# Patient Record
Sex: FEMALE | Race: WHITE | URBAN - METROPOLITAN AREA
[De-identification: names, ages, dates, MRNs, and addresses within clinical notes are randomized per-mention and may not be internally consistent; named-entity substitution may affect disease eponyms.]

---

## 2022-10-04 ENCOUNTER — LAB OUTSIDE AN ENCOUNTER (OUTPATIENT)
Dept: URBAN - METROPOLITAN AREA CLINIC 98 | Facility: CLINIC | Age: 67
End: 2022-10-04

## 2022-10-04 ENCOUNTER — OFFICE VISIT (OUTPATIENT)
Dept: URBAN - METROPOLITAN AREA CLINIC 98 | Facility: CLINIC | Age: 67
End: 2022-10-04
Payer: MEDICARE

## 2022-10-04 ENCOUNTER — WEB ENCOUNTER (OUTPATIENT)
Dept: URBAN - METROPOLITAN AREA CLINIC 98 | Facility: CLINIC | Age: 67
End: 2022-10-04

## 2022-10-04 VITALS
DIASTOLIC BLOOD PRESSURE: 73 MMHG | HEIGHT: 59 IN | SYSTOLIC BLOOD PRESSURE: 108 MMHG | WEIGHT: 127.8 LBS | HEART RATE: 75 BPM | TEMPERATURE: 97.2 F | BODY MASS INDEX: 25.76 KG/M2

## 2022-10-04 DIAGNOSIS — R19.4 CHANGE IN BOWEL HABITS: ICD-10-CM

## 2022-10-04 PROCEDURE — 99214 OFFICE O/P EST MOD 30 MIN: CPT | Performed by: INTERNAL MEDICINE

## 2022-10-04 RX ORDER — CLONAZEPAM 0.5 MG/1
1 TABLET AT BEDTIME TABLET ORAL ONCE A DAY
Status: ACTIVE | COMMUNITY

## 2022-10-04 RX ORDER — METHOCARBAMOL 750 MG/1
1 TABLET TABLET ORAL
Status: ACTIVE | COMMUNITY

## 2022-10-04 RX ORDER — LEVOTHYROXINE SODIUM 88 UG/1
1 TABLET IN THE MORNING ON AN EMPTY STOMACH TABLET ORAL TWICE A DAY
Status: ACTIVE | COMMUNITY

## 2022-10-04 RX ORDER — ZOLPIDEM TARTRATE 10 MG/1
1 TABLET AT BEDTIME AS NEEDED TABLET, FILM COATED ORAL ONCE A DAY
Status: ACTIVE | COMMUNITY

## 2022-10-04 NOTE — HPI-TODAY'S VISIT:
Here with 10 d bloating watery diarrhea weakness nausea headache  bm 4 / day  started pepto - still soft stool but black  probiotics started tolerating diet including fish tacos

## 2022-10-05 ENCOUNTER — LAB OUTSIDE AN ENCOUNTER (OUTPATIENT)
Dept: URBAN - METROPOLITAN AREA CLINIC 98 | Facility: CLINIC | Age: 67
End: 2022-10-05

## 2022-10-12 LAB — GASTROINTESTINAL PATHOGEN: (no result)

## 2022-10-13 ENCOUNTER — TELEPHONE ENCOUNTER (OUTPATIENT)
Dept: URBAN - METROPOLITAN AREA CLINIC 98 | Facility: CLINIC | Age: 67
End: 2022-10-13

## 2023-06-16 ENCOUNTER — OFFICE VISIT (OUTPATIENT)
Dept: URBAN - METROPOLITAN AREA CLINIC 98 | Facility: CLINIC | Age: 68
End: 2023-06-16

## 2024-03-13 ENCOUNTER — OV EP (OUTPATIENT)
Dept: URBAN - METROPOLITAN AREA CLINIC 98 | Facility: CLINIC | Age: 69
End: 2024-03-13
Payer: MEDICARE

## 2024-03-13 VITALS
HEART RATE: 74 BPM | WEIGHT: 128.8 LBS | DIASTOLIC BLOOD PRESSURE: 80 MMHG | HEIGHT: 59 IN | TEMPERATURE: 97.8 F | BODY MASS INDEX: 25.96 KG/M2 | SYSTOLIC BLOOD PRESSURE: 117 MMHG

## 2024-03-13 DIAGNOSIS — R19.5 LOOSE STOOLS: ICD-10-CM

## 2024-03-13 DIAGNOSIS — R15.9 INCONTINENCE OF FECES, UNSPECIFIED FECAL INCONTINENCE TYPE: ICD-10-CM

## 2024-03-13 PROBLEM — 72042002: Status: ACTIVE | Noted: 2024-03-13

## 2024-03-13 PROCEDURE — 99213 OFFICE O/P EST LOW 20 MIN: CPT

## 2024-03-13 RX ORDER — METHOCARBAMOL 750 MG/1
1 TABLET TABLET ORAL
Status: ON HOLD | COMMUNITY

## 2024-03-13 RX ORDER — ZOLPIDEM TARTRATE 10 MG/1
1 TABLET AT BEDTIME AS NEEDED TABLET, FILM COATED ORAL ONCE A DAY
Status: ACTIVE | COMMUNITY

## 2024-03-13 RX ORDER — CLONAZEPAM 0.5 MG/1
1 TABLET AT BEDTIME TABLET ORAL ONCE A DAY
Status: ON HOLD | COMMUNITY

## 2024-03-13 RX ORDER — LEVOTHYROXINE SODIUM 88 UG/1
1 TABLET IN THE MORNING ON AN EMPTY STOMACH TABLET ORAL TWICE A DAY
Status: ACTIVE | COMMUNITY

## 2024-03-13 NOTE — HPI-TODAY'S VISIT:
Visit 10/4/2022- Dr. Torres Here with 10 d bloating watery diarrhea weakness nausea headache  bm 4 / day  started pepto - still soft stool but black  probiotics started tolerating diet including fish tacos Visit 3/13/2024- Annemarie Montano NP - Here with complaints of fecal incontinence  - Dx. with mild Parkinson's 11/2023 - Fecal incontinence 3 episodes in 1 year  - Last episode last Thursday - Patient in the car; sudden abdominal cramping with immediate fecal release - Reports she 1 episode with full evacuation. - Typically BM 2 times per day - Stools are soft for past 6 months - Denies bright red blood, melena or mucus in stools - No unintentional weight loss - Denies nausea, vomiting, heartburn - Dysphagia at times with medications - Started magnesium 2 months ago for because she does feel evacuated - Last colonoscopy 3/11/2020 > once benign colon polyp, internal hemorrhoids.

## 2024-03-25 ENCOUNTER — OV EP (OUTPATIENT)
Dept: URBAN - METROPOLITAN AREA CLINIC 98 | Facility: CLINIC | Age: 69
End: 2024-03-25
Payer: MEDICARE

## 2024-03-25 ENCOUNTER — LAB (OUTPATIENT)
Dept: URBAN - METROPOLITAN AREA CLINIC 98 | Facility: CLINIC | Age: 69
End: 2024-03-25

## 2024-03-25 VITALS
BODY MASS INDEX: 24.19 KG/M2 | SYSTOLIC BLOOD PRESSURE: 127 MMHG | TEMPERATURE: 97.2 F | WEIGHT: 120 LBS | HEIGHT: 59 IN | DIASTOLIC BLOOD PRESSURE: 83 MMHG | HEART RATE: 80 BPM | RESPIRATION RATE: 18 BRPM

## 2024-03-25 DIAGNOSIS — K86.81 EXOCRINE PANCREATIC INSUFFICIENCY: ICD-10-CM

## 2024-03-25 DIAGNOSIS — R63.4 WEIGHT LOSS: ICD-10-CM

## 2024-03-25 DIAGNOSIS — R19.5 LOOSE STOOLS: ICD-10-CM

## 2024-03-25 PROCEDURE — 99214 OFFICE O/P EST MOD 30 MIN: CPT

## 2024-03-25 RX ORDER — LEVOTHYROXINE SODIUM 88 UG/1
1 TABLET IN THE MORNING ON AN EMPTY STOMACH TABLET ORAL TWICE A DAY
Status: ACTIVE | COMMUNITY

## 2024-03-25 RX ORDER — ZOLPIDEM TARTRATE 10 MG/1
1 TABLET AT BEDTIME AS NEEDED TABLET, FILM COATED ORAL ONCE A DAY
Status: ACTIVE | COMMUNITY

## 2024-03-25 RX ORDER — METHOCARBAMOL 750 MG/1
1 TABLET TABLET ORAL
COMMUNITY

## 2024-03-25 RX ORDER — PANCRELIPASE 36000; 180000; 114000 [USP'U]/1; [USP'U]/1; [USP'U]/1
2 CAPSULES WITH THREE LARGE MEALS AND 1-2 CAPSULES WITH TWO SNACKS CAPSULE, DELAYED RELEASE PELLETS ORAL AS DIRECTED
Qty: 300 | Refills: 11 | OUTPATIENT
Start: 2024-03-25 | End: 2024-04-06

## 2024-03-25 RX ORDER — CLONAZEPAM 0.5 MG/1
1 TABLET AT BEDTIME TABLET ORAL ONCE A DAY
COMMUNITY

## 2024-03-25 NOTE — HPI-TODAY'S VISIT:
Visit 10/4/2022- Dr. Torres Here with 10 d bloating watery diarrhea weakness nausea headache  bm 4 / day  started pepto - still soft stool but black  probiotics started tolerating diet including fish tacos Visit 3/13/2024- Annemarie Montano NP - Here with complaints of fecal incontinence  - Dx. with mild Parkinson's 11/2023 - Fecal incontinence 3 episodes in 1 year  - Last episode last Thursday - Patient in the car; sudden abdominal cramping with immediate fecal release - Reports she 1 episode with full evacuation. - Typically BM 2 times per day - Stools are soft for past 6 months - Denies bright red blood, melena or mucus in stools - No unintentional weight loss - Denies nausea, vomiting, heartburn - Dysphagia at times with medications - Started magnesium 2 months ago for because she does feel evacuated - Last colonoscopy 3/11/2020 > once benign colon polyp, internal hemorrhoids. Visit 3/25/24- Annemarie Montano NP - Here to follow-up and discuss stool studies - Pancreatic elastase > 192 - No episodes of incontinence since last visit - BM 2 times per day - Mother primary pancreatic cancer at 74

## 2024-03-26 ENCOUNTER — LAB (OUTPATIENT)
Dept: URBAN - METROPOLITAN AREA CLINIC 98 | Facility: CLINIC | Age: 69
End: 2024-03-26

## 2024-03-26 PROBLEM — 123608004: Status: ACTIVE | Noted: 2024-03-26

## 2024-05-01 ENCOUNTER — OFFICE VISIT (OUTPATIENT)
Dept: URBAN - METROPOLITAN AREA MEDICAL CENTER 28 | Facility: MEDICAL CENTER | Age: 69
End: 2024-05-01
Payer: MEDICARE

## 2024-05-01 DIAGNOSIS — K31.89 OTHER DISEASES OF STOMACH AND DUODENUM: ICD-10-CM

## 2024-05-01 DIAGNOSIS — R93.3 ABN FINDINGS-GI TRACT: ICD-10-CM

## 2024-05-01 DIAGNOSIS — K86.89 OTHER SPECIFIED DISEASES OF PANCREAS: ICD-10-CM

## 2024-05-01 PROCEDURE — 43259 EGD US EXAM DUODENUM/JEJUNUM: CPT | Performed by: INTERNAL MEDICINE

## 2024-05-01 PROCEDURE — 43239 EGD BIOPSY SINGLE/MULTIPLE: CPT | Performed by: INTERNAL MEDICINE

## 2024-05-01 RX ORDER — LEVOTHYROXINE SODIUM 88 UG/1
1 TABLET IN THE MORNING ON AN EMPTY STOMACH TABLET ORAL TWICE A DAY
Status: ACTIVE | COMMUNITY

## 2024-05-01 RX ORDER — METHOCARBAMOL 750 MG/1
1 TABLET TABLET ORAL
COMMUNITY

## 2024-05-01 RX ORDER — CLONAZEPAM 0.5 MG/1
1 TABLET AT BEDTIME TABLET ORAL ONCE A DAY
COMMUNITY

## 2024-05-01 RX ORDER — ZOLPIDEM TARTRATE 10 MG/1
1 TABLET AT BEDTIME AS NEEDED TABLET, FILM COATED ORAL ONCE A DAY
Status: ACTIVE | COMMUNITY

## 2024-05-03 ENCOUNTER — TELEPHONE ENCOUNTER (OUTPATIENT)
Dept: URBAN - METROPOLITAN AREA CLINIC 98 | Facility: CLINIC | Age: 69
End: 2024-05-03

## 2024-05-21 ENCOUNTER — OFFICE VISIT (OUTPATIENT)
Dept: URBAN - METROPOLITAN AREA CLINIC 98 | Facility: CLINIC | Age: 69
End: 2024-05-21

## 2024-06-20 ENCOUNTER — OFFICE VISIT (OUTPATIENT)
Dept: URBAN - METROPOLITAN AREA CLINIC 98 | Facility: CLINIC | Age: 69
End: 2024-06-20

## 2024-07-16 ENCOUNTER — DASHBOARD ENCOUNTERS (OUTPATIENT)
Age: 69
End: 2024-07-16

## 2024-07-17 ENCOUNTER — OFFICE VISIT (OUTPATIENT)
Dept: URBAN - METROPOLITAN AREA CLINIC 98 | Facility: CLINIC | Age: 69
End: 2024-07-17

## 2024-07-17 VITALS
SYSTOLIC BLOOD PRESSURE: 102 MMHG | DIASTOLIC BLOOD PRESSURE: 67 MMHG | TEMPERATURE: 97.3 F | WEIGHT: 125.4 LBS | BODY MASS INDEX: 25.28 KG/M2 | HEART RATE: 69 BPM | HEIGHT: 59 IN

## 2024-07-17 RX ORDER — ZOLPIDEM TARTRATE 10 MG/1
1 TABLET AT BEDTIME AS NEEDED TABLET, FILM COATED ORAL ONCE A DAY
Status: ON HOLD | COMMUNITY

## 2024-07-17 RX ORDER — CLONAZEPAM 0.5 MG/1
1 TABLET AT BEDTIME TABLET ORAL ONCE A DAY
COMMUNITY

## 2024-07-17 RX ORDER — METHOCARBAMOL 750 MG/1
1 TABLET TABLET ORAL
COMMUNITY

## 2024-07-17 RX ORDER — LEVOTHYROXINE SODIUM 88 UG/1
1 TABLET IN THE MORNING ON AN EMPTY STOMACH TABLET ORAL TWICE A DAY
Status: ACTIVE | COMMUNITY

## 2024-07-17 NOTE — HPI-TODAY'S VISIT:
Visit 10/4/2022- Dr. Torres Here with 10 d bloating watery diarrhea weakness nausea headache  bm 4 / day  started pepto - still soft stool but black  probiotics started tolerating diet including fish tacos Visit 3/13/2024- Annemarie Montano NP - Here with complaints of fecal incontinence  - Dx. with mild Parkinson's 11/2023 - Fecal incontinence 3 episodes in 1 year  - Last episode last Thursday - Patient in the car; sudden abdominal cramping with immediate fecal release - Reports she 1 episode with full evacuation. - Typically BM 2 times per day - Stools are soft for past 6 months - Denies bright red blood, melena or mucus in stools - No unintentional weight loss - Denies nausea, vomiting, heartburn - Dysphagia at times with medications - Started magnesium 2 months ago for because she does feel evacuated - Last colonoscopy 3/11/2020 > once benign colon polyp, internal hemorrhoids. Visit 3/25/24- Annemarie Montano NP - Here to follow-up and discuss stool studies - Pancreatic elastase > 192 - No episodes of incontinence since last visit - BM 2 times per day - Mother primary pancreatic cancer at 74  7/17/24- Annemarie Montano NP - Here to follow-up EPI, dilated CBD and EUS - EUS 5/1/24 - Taking CREON 1 capsule with large meals   3/26/2024 09:06 AMPatient ID: [id number]            Exam Name: MR Abdomen (MRCP) w and wo IVAge:        69Y 1M        Contrast   [id number]:        1955         Referrer:  Eli Montano, [facility] #:      [id number]  EXAM: MR ABDOMEN WITH AND WITHOUT CONTRAST W MRCP  CLINICAL INDICATION:  - Exocrine pancreatic insufficiency. TECHNIQUE: Multiplanar multisequence images were obtained through the abdomen without and with IVadministration of 12 mL of Clariscan. MRCP images were also obtained including coronal 3-D MRCP images.  COMPARISON: No comparisons are available at this time.  FINDINGS:  There is mild limitation due to motion. Evaluation of the visualized lower chest demonstrates the heart is within normal limits in size. There isno significant sized pleural or pericardial effusion at the visualized lung bases. Please note the lungsare suboptimally evaluated with MRI. There is mild elevation of the left hemidiaphragm.  The liver is within normal limits in size and the right hepatic lobe measures 12.6 cm in length. There isno significant hepatic steatosis. There is no suspicious enhancing hepatic mass.  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 The patient is statuspost splenectomy. The pancreatic head and and neck are preserved. The main pancreatic duct is visualizedin the pancreatic head and neck and is nondilated and measuring 3 mm. There is pronounced inferioratrophy of the pancreatic body and tail. Alternatively linear signal intensity at the expected locationof the pancreatic body and tail may represent postsurgical change status post partial pancreatectomy.Correlation with the surgical history is recommended. The kidneys enhance symmetrically. There is no hydronephrosis.  The abdominal aorta is patent without aneurysmal dilatation. The celiac is patent. The SMA and inferiormesenteric arteries are patent. The hepatic veins and portal veins are patent.  There is no abdominal ascites. There is no abdominal lymphadenopathy by size criteria. There is no evidence for mechanical intestinal obstruction of the visualized bowel. The bowel is notimaged in its entirety. There are multiple foci of susceptibility artifact along the anterior abdominal wall consistent withpostsurgical change. There is an S-shaped scoliosis of the thoracolumbar spine. There is partiallyvisualizes susceptibility artifact in the right hip status post right hip arthroplasty. No suspiciousosseous lesion is identified.  MRCP: The gallbladder is unremarkable within the limitation of motion. There is mild diffuse intrahepaticand extrahepatic biliary dilatation. The common bile duct measures 7 mm. No biliary stricture or fillingdefect is identified within the limitation of motion.  As described the main pancreatic duct is within normal limits in course and caliber within the pancreatichead and neck. There is no evidence for pancreas divisum.  IMPRESSION:1. Status post splenectomy. The pancreatic body and tail are severely atrophic or less likely, absentwith postsurgical change and linear stranding. The pancreatic head and neck appear preserved. Correlationwith the prior surgical history is recommended.2. No suspicious pancreatic mass is identified. No MRI evidence for acute pancreatitis.3. Mild diffuse intrahepatic and extrahepatic biliary dilatation to the ampulla. No biliary stricture orfilling defect is identified. This may be at the upper limits of normal for the patient's age. Otheretiologies such as sphincter of Dae dysfunction are in the differential. Further evaluation with biliarylabs values is recommended. Consider endoscopic correlation if indicated/if there is evidence for biliaryobstruction.

## 2025-01-09 ENCOUNTER — WEB ENCOUNTER (OUTPATIENT)
Dept: URBAN - METROPOLITAN AREA CLINIC 96 | Facility: CLINIC | Age: 70
End: 2025-01-09

## 2025-01-15 ENCOUNTER — OFFICE VISIT (OUTPATIENT)
Dept: URBAN - METROPOLITAN AREA CLINIC 98 | Facility: CLINIC | Age: 70
End: 2025-01-15

## 2025-03-20 ENCOUNTER — TELEPHONE ENCOUNTER (OUTPATIENT)
Dept: URBAN - METROPOLITAN AREA CLINIC 98 | Facility: CLINIC | Age: 70
End: 2025-03-20

## 2025-03-25 ENCOUNTER — OFFICE VISIT (OUTPATIENT)
Dept: URBAN - METROPOLITAN AREA CLINIC 98 | Facility: CLINIC | Age: 70
End: 2025-03-25

## 2025-03-25 RX ORDER — METHOCARBAMOL 750 MG/1
1 TABLET TABLET ORAL
Status: ACTIVE | COMMUNITY

## 2025-03-25 RX ORDER — PANCRELIPASE 36000; 180000; 114000 [USP'U]/1; [USP'U]/1; [USP'U]/1
2 TABLETS WITH MEALS, AND 1-2 TABLET WITH SNACKS CAPSULE, DELAYED RELEASE PELLETS ORAL
Qty: 300 | Refills: 11 | OUTPATIENT
Start: 2025-03-25

## 2025-03-25 RX ORDER — CLONAZEPAM 0.5 MG/1
1 TABLET AT BEDTIME TABLET ORAL ONCE A DAY
Status: ACTIVE | COMMUNITY

## 2025-03-25 RX ORDER — LEVOTHYROXINE SODIUM 75 UG/1
1 TABLET IN THE MORNING ON AN EMPTY STOMACH TABLET ORAL TWICE A DAY
Status: ACTIVE | COMMUNITY

## 2025-03-25 RX ORDER — ZOLPIDEM TARTRATE 10 MG/1
1 TABLET AT BEDTIME AS NEEDED TABLET, FILM COATED ORAL ONCE A DAY
Status: ACTIVE | COMMUNITY

## 2025-03-25 NOTE — HPI-TODAY'S VISIT:
Visit 10/4/2022- Dr. Torres Here with 10 d bloating watery diarrhea weakness nausea headache  bm 4 / day  started pepto - still soft stool but black  probiotics started tolerating diet including fish tacos Visit 3/13/2024- Annemarie Montano NP - Here with complaints of fecal incontinence  - Dx. with mild Parkinson's 11/2023 - Fecal incontinence 3 episodes in 1 year  - Last episode last Thursday - Patient in the car; sudden abdominal cramping with immediate fecal release - Reports she 1 episode with full evacuation. - Typically BM 2 times per day - Stools are soft for past 6 months - Denies bright red blood, melena or mucus in stools - No unintentional weight loss - Denies nausea, vomiting, heartburn - Dysphagia at times with medications - Started magnesium 2 months ago for because she does feel evacuated - Last colonoscopy 3/11/2020 > once benign colon polyp, internal hemorrhoids. Visit 3/25/24- Anneamrie Montano NP - Here to follow-up and discuss stool studies - Pancreatic elastase > 192 - No episodes of incontinence since last visit - BM 2 times per day - Mother primary pancreatic cancer at 74  7/17/24- Annemarie Montano NP - Here to follow-up EPI, dilated CBD and EUS - Friend here for office visit today - EUS 5/1/24: normal esophagus, normal stomach, normal duodenum. Pancreatic cyst in body 6 mm no bx of tissue and no FNA. No abnormality in ampulla, CBD or pancreatic duct. Entire pancreas with atrophy and diffuseechogenicity - Taking CREON 1 capsule with large meals  3/26/2024 MR ABDOMEN WITH AND WITHOUT CONTRAST W MRCP CLINICAL INDICATION:  - Exocrine pancreatic insufficiency. TECHNIQUE: Multiplanar multisequence images were obtained through the abdomen without and with IVadministration of 12 mL of Clariscan. MRCP images were also obtained including coronal 3-D MRCP images. COMPARISON: No comparisons are available at this time. FINDINGS: There is mild limitation due to motion. Evaluation of the visualized lower chest demonstrates the heart is within normal limits in size. There isno significant sized pleural or pericardial effusion at the visualized lung bases. Please note the lungsare suboptimally evaluated with MRI. There is mild elevation of the left hemidiaphragm. The liver is within normal limits in size and the right hepatic lobe measures 12.6 cm in length. There isno significant hepatic steatosis. There is no suspicious enhancing hepatic mass. 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 The patient is statuspost splenectomy. The pancreatic head and and neck are preserved. The main pancreatic duct is visualizedin the pancreatic head and neck and is nondilated and measuring 3 mm. There is pronounced inferioratrophy of the pancreatic body and tail. Alternatively linear signal intensity at the expected locationof the pancreatic body and tail may represent postsurgical change status post partial pancreatectomy.Correlation with the surgical history is recommended. The kidneys enhance symmetrically. There is no hydronephrosis. The abdominal aorta is patent without aneurysmal dilatation. The celiac is patent. The SMA and inferiormesenteric arteries are patent. The hepatic veins and portal veins are patent. There is no abdominal ascites. There is no abdominal lymphadenopathy by size criteria. There is no evidence for mechanical intestinal obstruction of the visualized bowel. The bowel is notimaged in its entirety. There are multiple foci of susceptibility artifact along the anterior abdominal wall consistent withpostsurgical change. There is an S-shaped scoliosis of the thoracolumbar spine. There is partiallyvisualizes susceptibility artifact in the right hip status post right hip arthroplasty. No suspiciousosseous lesion is identified. MRCP: The gallbladder is unremarkable within the limitation of motion. There is mild diffuse intrahepaticand extrahepatic biliary dilatation. The common bile duct measures 7 mm. No biliary stricture or fillingdefect is identified within the limitation of motion. As described the main pancreatic duct is within normal limits in course and caliber within the pancreatichead and neck. There is no evidence for pancreas divisum.  IMPRESSION:1. Status post splenectomy. The pancreatic body and tail are severely atrophic or less likely, absentwith postsurgical change and linear stranding. The pancreatic head and neck appear preserved. Correlationwith the prior surgical history is recommended.2. No suspicious pancreatic mass is identified. No MRI evidence for acute pancreatitis.3. Mild diffuse intrahepatic and extrahepatic biliary dilatation to the ampulla. No biliary stricture orfilling defect is identified. This may be at the upper limits of normal for the patient's age. Otheretiologies such as sphincter of Dae dysfunction are in the differential. Further evaluation with biliarylabs values is recommended. Consider endoscopic correlation if indicated/if there is evidence for biliaryobstruction.  3/25/25- Annemarie von Steph, NP - 71 yo female here to follow-up EPI - taking 1 creon with large meals - BM alternate loose to firm - foul smelling flatulence - in constant pain with parkinson's and scoliosis - MRI completed 3/2024- enter - EUS completed- enter

## 2025-07-25 ENCOUNTER — OFFICE VISIT (OUTPATIENT)
Dept: URBAN - METROPOLITAN AREA CLINIC 98 | Facility: CLINIC | Age: 70
End: 2025-07-25